# Patient Record
Sex: FEMALE | Race: WHITE | NOT HISPANIC OR LATINO | Employment: FULL TIME | URBAN - METROPOLITAN AREA
[De-identification: names, ages, dates, MRNs, and addresses within clinical notes are randomized per-mention and may not be internally consistent; named-entity substitution may affect disease eponyms.]

---

## 2021-04-19 ENCOUNTER — TRANSCRIBE ORDERS (OUTPATIENT)
Dept: ADMINISTRATIVE | Facility: HOSPITAL | Age: 66
End: 2021-04-19

## 2021-04-19 DIAGNOSIS — R70.0 ELEVATED SED RATE: ICD-10-CM

## 2021-04-19 DIAGNOSIS — D64.9 ANEMIA, UNSPECIFIED TYPE: Primary | ICD-10-CM

## 2021-04-29 ENCOUNTER — HOSPITAL ENCOUNTER (OUTPATIENT)
Dept: RADIOLOGY | Facility: MEDICAL CENTER | Age: 66
Discharge: HOME | End: 2021-04-29
Payer: COMMERCIAL

## 2021-04-29 ENCOUNTER — APPOINTMENT (OUTPATIENT)
Dept: RADIOLOGY | Facility: MEDICAL CENTER | Age: 66
End: 2021-04-29
Payer: COMMERCIAL

## 2021-04-29 ENCOUNTER — TRANSCRIBE ORDERS (OUTPATIENT)
Dept: ADMINISTRATIVE | Facility: HOSPITAL | Age: 66
End: 2021-04-29

## 2021-04-29 DIAGNOSIS — D64.9 ANEMIA, UNSPECIFIED TYPE: ICD-10-CM

## 2021-04-29 DIAGNOSIS — R70.0 ELEVATED SED RATE: ICD-10-CM

## 2021-04-29 DIAGNOSIS — R05.9 COUGH: Primary | ICD-10-CM

## 2021-04-29 DIAGNOSIS — R05.9 COUGH: ICD-10-CM

## 2021-04-29 PROCEDURE — 71046 X-RAY EXAM CHEST 2 VIEWS: CPT

## 2021-04-29 PROCEDURE — G1004 CDSM NDSC: HCPCS

## 2021-04-29 PROCEDURE — 74177 CT ABD & PELVIS W/CONTRAST: CPT

## 2021-04-29 RX ADMIN — IOHEXOL 100 ML: 350 INJECTION, SOLUTION INTRAVENOUS at 09:59

## 2021-04-29 RX ADMIN — IOHEXOL 50 ML: 240 INJECTION, SOLUTION INTRATHECAL; INTRAVASCULAR; INTRAVENOUS; ORAL at 09:59

## 2021-09-01 ENCOUNTER — OFFICE VISIT (OUTPATIENT)
Dept: GASTROENTEROLOGY | Facility: CLINIC | Age: 66
End: 2021-09-01
Payer: COMMERCIAL

## 2021-09-01 VITALS
DIASTOLIC BLOOD PRESSURE: 78 MMHG | SYSTOLIC BLOOD PRESSURE: 121 MMHG | BODY MASS INDEX: 27.66 KG/M2 | HEART RATE: 87 BPM | WEIGHT: 162 LBS | HEIGHT: 64 IN

## 2021-09-01 DIAGNOSIS — D50.9 IRON DEFICIENCY ANEMIA, UNSPECIFIED IRON DEFICIENCY ANEMIA TYPE: ICD-10-CM

## 2021-09-01 DIAGNOSIS — Z12.11 SCREENING FOR COLON CANCER: Primary | ICD-10-CM

## 2021-09-01 DIAGNOSIS — Z12.12 SCREENING FOR CANCER OF THE RECTUM: ICD-10-CM

## 2021-09-01 PROCEDURE — 99204 OFFICE O/P NEW MOD 45 MIN: CPT | Performed by: INTERNAL MEDICINE

## 2021-09-01 RX ORDER — ALBUTEROL SULFATE 90 UG/1
AEROSOL, METERED RESPIRATORY (INHALATION)
COMMUNITY
Start: 2021-08-04

## 2021-09-01 RX ORDER — IBUPROFEN 200 MG
TABLET ORAL
COMMUNITY
End: 2021-10-07 | Stop reason: ALTCHOICE

## 2021-09-01 RX ORDER — POLYETHYLENE GLYCOL 3350 17 G/17G
POWDER, FOR SOLUTION ORAL
Qty: 238 G | Refills: 0 | Status: SHIPPED | OUTPATIENT
Start: 2021-09-01 | End: 2021-10-07 | Stop reason: ALTCHOICE

## 2021-09-01 NOTE — PROGRESS NOTES
[facility] - Outpatient Consultation  David Lawton 78 y o  female MRN: [id number]  Encounter: [id number]          ASSESSMENT AND PLAN:      1  Screening for colon cancer  -     Colonoscopy; Future; Expected date: 09/01/2021  -     EGD; Future; Expected date: 09/01/2021  -     polyethylene glycol (MiraLax) 17 GM/SCOOP powder; Day prior to procedure: clear liquid diet only  At 2pm take 300ml Magnesium Citrate  At 6pm take Miralax prep (238g Miralax mixed with 64 oz Gatorade)    2  Iron deficiency anemia, unspecified iron deficiency anemia type  -     Colonoscopy; Future; Expected date: 09/01/2021  -     EGD; Future; Expected date: 09/01/2021  -     polyethylene glycol (MiraLax) 17 GM/SCOOP powder; Day prior to procedure: clear liquid diet only  At 2pm take 300ml Magnesium Citrate  At 6pm take Miralax prep (238g Miralax mixed with 64 oz Gatorade)    3  Screening for cancer of the rectum  -     Colonoscopy; Future; Expected date: 09/01/2021  -     EGD; Future; Expected date: 09/01/2021  -     polyethylene glycol (MiraLax) 17 GM/SCOOP powder; Day prior to procedure: clear liquid diet only  At 2pm take 300ml Magnesium Citrate  At 6pm take Miralax prep (238g Miralax mixed with 64 oz Gatorade)      History of anemia, no apparent GI blood loss though still GI source remains a diagnosis of exclusion  Also needs screening colonoscopy  Was set up for EGD colonoscopy for further evaluation of these and go from there  The procedure and prep discussed at length with patient  CT abdomen was unrevealing  ______________________________________________________________________    HPI:      I thank you for ask me see Nehemias Hughes for evaluation of her anemia  Found to be anemic, had a CT scan unremarkable, appetite is fair weight stable denies any melena hematochezia nausea vomiting blood in stools or urine, denies any psych ENT  gyn problems    Denies any chest pain shortness of breath dysphagia coughing choking spells nocturnal reflux regurgitation bronchitis pneumonias bowels generally regular  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  No fever chills travel no one else around her sick, is not a vegetarian, does not donate blood  Diet medications more than 10 pertinent systems reviewed  REVIEW OF SYSTEMS:    CONSTITUTIONAL: Denies any fever, chills, rigors, and weight loss  HEENT: No earache or tinnitus  CARDIOVASCULAR: No chest pain or palpitations  RESPIRATORY: Denies any cough, hemoptysis, shortness of breath or dyspnea on exertion  GASTROINTESTINAL: As noted in the History of Present Illness  GENITOURINARY: Denies any hematuria or dysuria  NEUROLOGIC: No dizziness or vertigo  MUSCULOSKELETAL: Denies any joint swellings  SKIN: Denies skin rashes or itching  ENDOCRINE: Denies excessive thirst  Denies intolerance to heat or cold  PSYCHOSOCIAL: Denies depression or anxiety  Denies any recent memory loss  Historical Information   History reviewed  No pertinent past medical history    Past Surgical History:   Procedure Laterality Date    TUBAL LIGATION       Social History   Social History     Substance and Sexual Activity   Alcohol Use Never     Social History     Substance and Sexual Activity   Drug Use Never     Social History     Tobacco Use   Smoking Status Current Every Day Smoker   Smokeless Tobacco Never Used     Family History   Problem Relation Age of Onset    No Known Problems Mother     No Known Problems Father        Meds/Allergies       Current Outpatient Medications:     albuterol (PROVENTIL HFA,VENTOLIN HFA) 90 mcg/act inhaler    Ascorbic Acid (VITAMIN C PO)    Cholecalciferol (VITAMIN D3 PO)    ibuprofen (MOTRIN) 200 mg tablet    Multiple Vitamin (MULTIVITAMIN ADULT PO)    Red Yeast Rice Extract (RED YEAST RICE PO)    polyethylene glycol (MiraLax) 17 GM/SCOOP powder    Allergies   Allergen Reactions    Levofloxacin Hives and Other (See Comments)     Burning and hot reaction      Penicillins Hives    Sulfamethoxazole-Trimethoprim Hives    Codeine Itching and Rash    Silver Hives and Rash           Objective     Blood pressure 121/78, pulse 87, height 5' 4" (1 626 m), weight 73 5 kg (162 lb)  Body mass index is 27 81 kg/m²  PHYSICAL EXAM:      General Appearance:   Alert, cooperative, no distress   HEENT:   Normocephalic, atraumatic, anicteric  Neck:  Supple, symmetrical, trachea midline   Lungs:   Clear to auscultation bilaterally; no rales, rhonchi or wheezing; respirations unlabored    Heart[de-identified]   Regular rate and rhythm; no murmur  Abdomen:   Soft, non-tender, non-distended; normal bowel sounds; no masses, no organomegaly    Genitalia:   Deferred    Rectal:   Deferred    Extremities:  No cyanosis, clubbing or edema    Skin:  No jaundice, rashes, or lesions    Lymph nodes:  No palpable cervical lymphadenopathy        Lab Results:   No visits with results within 1 Day(s) from this visit  Latest known visit with results is:   No results found for any previous visit  Radiology Results:   No results found

## 2021-09-02 ENCOUNTER — TELEPHONE (OUTPATIENT)
Dept: GASTROENTEROLOGY | Facility: CLINIC | Age: 66
End: 2021-09-02

## 2021-10-07 ENCOUNTER — ANESTHESIA (OUTPATIENT)
Dept: GASTROENTEROLOGY | Facility: AMBULATORY SURGERY CENTER | Age: 66
End: 2021-10-07

## 2021-10-07 ENCOUNTER — ANESTHESIA EVENT (OUTPATIENT)
Dept: GASTROENTEROLOGY | Facility: AMBULATORY SURGERY CENTER | Age: 66
End: 2021-10-07

## 2021-10-07 ENCOUNTER — HOSPITAL ENCOUNTER (OUTPATIENT)
Dept: GASTROENTEROLOGY | Facility: AMBULATORY SURGERY CENTER | Age: 66
Discharge: HOME | End: 2021-10-07
Payer: COMMERCIAL

## 2021-10-07 VITALS
DIASTOLIC BLOOD PRESSURE: 71 MMHG | RESPIRATION RATE: 18 BRPM | HEIGHT: 64 IN | SYSTOLIC BLOOD PRESSURE: 119 MMHG | BODY MASS INDEX: 27.66 KG/M2 | TEMPERATURE: 98.3 F | HEART RATE: 59 BPM | WEIGHT: 162 LBS | OXYGEN SATURATION: 100 %

## 2021-10-07 DIAGNOSIS — D50.9 IRON DEFICIENCY ANEMIA, UNSPECIFIED IRON DEFICIENCY ANEMIA TYPE: ICD-10-CM

## 2021-10-07 DIAGNOSIS — Z12.11 SCREENING FOR COLON CANCER: ICD-10-CM

## 2021-10-07 DIAGNOSIS — Z12.12 SCREENING FOR CANCER OF THE RECTUM: ICD-10-CM

## 2021-10-07 PROCEDURE — 88341 IMHCHEM/IMCYTCHM EA ADD ANTB: CPT | Performed by: PATHOLOGY

## 2021-10-07 PROCEDURE — 88342 IMHCHEM/IMCYTCHM 1ST ANTB: CPT | Performed by: PATHOLOGY

## 2021-10-07 PROCEDURE — 88305 TISSUE EXAM BY PATHOLOGIST: CPT | Performed by: PATHOLOGY

## 2021-10-07 PROCEDURE — 45385 COLONOSCOPY W/LESION REMOVAL: CPT | Performed by: INTERNAL MEDICINE

## 2021-10-07 PROCEDURE — 43239 EGD BIOPSY SINGLE/MULTIPLE: CPT | Performed by: INTERNAL MEDICINE

## 2021-10-07 PROCEDURE — 00813 ANES UPR LWR GI NDSC PX: CPT | Performed by: NURSE ANESTHETIST, CERTIFIED REGISTERED

## 2021-10-07 RX ORDER — PROPRANOLOL/HYDROCHLOROTHIAZID 40 MG-25MG
TABLET ORAL DAILY
COMMUNITY

## 2021-10-07 RX ORDER — SODIUM CHLORIDE 9 MG/ML
125 INJECTION, SOLUTION INTRAVENOUS CONTINUOUS
Status: DISCONTINUED | OUTPATIENT
Start: 2021-10-07 | End: 2021-10-11 | Stop reason: HOSPADM

## 2021-10-07 RX ORDER — SODIUM CHLORIDE 9 MG/ML
INJECTION, SOLUTION INTRAVENOUS CONTINUOUS PRN
Status: DISCONTINUED | OUTPATIENT
Start: 2021-10-07 | End: 2021-10-07

## 2021-10-07 RX ORDER — PROPOFOL 10 MG/ML
INJECTION, EMULSION INTRAVENOUS AS NEEDED
Status: DISCONTINUED | OUTPATIENT
Start: 2021-10-07 | End: 2021-10-07

## 2021-10-07 RX ADMIN — PROPOFOL 100 MG: 10 INJECTION, EMULSION INTRAVENOUS at 13:39

## 2021-10-07 RX ADMIN — SODIUM CHLORIDE: 9 INJECTION, SOLUTION INTRAVENOUS at 13:38

## 2021-10-07 RX ADMIN — PROPOFOL 100 MG: 10 INJECTION, EMULSION INTRAVENOUS at 13:47

## 2021-10-07 RX ADMIN — PROPOFOL 50 MG: 10 INJECTION, EMULSION INTRAVENOUS at 13:44

## 2023-03-07 LAB — HBA1C MFR BLD HPLC: 6.3 %

## 2023-06-20 ENCOUNTER — CONSULT (OUTPATIENT)
Dept: GASTROENTEROLOGY | Facility: CLINIC | Age: 68
End: 2023-06-20
Payer: COMMERCIAL

## 2023-06-20 VITALS
WEIGHT: 158.6 LBS | BODY MASS INDEX: 27.08 KG/M2 | DIASTOLIC BLOOD PRESSURE: 80 MMHG | SYSTOLIC BLOOD PRESSURE: 127 MMHG | HEIGHT: 64 IN | HEART RATE: 70 BPM

## 2023-06-20 DIAGNOSIS — K76.0 FATTY LIVER: Primary | ICD-10-CM

## 2023-06-20 DIAGNOSIS — K82.4 GALL BLADDER POLYP: ICD-10-CM

## 2023-06-20 DIAGNOSIS — K44.9 HIATAL HERNIA: ICD-10-CM

## 2023-06-20 DIAGNOSIS — Z86.010 PERSONAL HISTORY OF COLONIC POLYPS: ICD-10-CM

## 2023-06-20 DIAGNOSIS — R16.0 HEPATOMEGALY: ICD-10-CM

## 2023-06-20 DIAGNOSIS — K57.90 DIVERTICULAR DISEASE: ICD-10-CM

## 2023-06-20 PROCEDURE — 99214 OFFICE O/P EST MOD 30 MIN: CPT | Performed by: INTERNAL MEDICINE

## 2023-06-20 RX ORDER — DICLOFENAC SODIUM 75 MG/1
TABLET, DELAYED RELEASE ORAL
COMMUNITY
Start: 2023-03-28

## 2023-06-20 RX ORDER — CHLORAL HYDRATE 500 MG
1000 CAPSULE ORAL DAILY
COMMUNITY

## 2023-06-20 RX ORDER — METHYLPREDNISOLONE 4 MG/1
TABLET ORAL
COMMUNITY
Start: 2023-06-15

## 2023-06-20 NOTE — PROGRESS NOTES
[facility] - Outpatient Consultation  Gary Coker 75 y o  female MRN: [id number]  Encounter: [id number]          ASSESSMENT AND PLAN:      1  Fatty liver  -     CBC and differential; Future  -     Comprehensive metabolic panel; Future  -     Iron Panel (Includes Ferritin, Iron Sat%, Iron, and TIBC); Future  -     BRISEYDA Screen w/ Reflex to Titer/Pattern; Future  -     Chronic Hepatitis Panel; Future    2  Diverticular disease    3  Personal history of colonic polyps    4  Hiatal hernia    5  Gall bladder polyp    6  Hepatomegaly      History of for mild fatty liver, minimal elevation of LFTs, her main risk factor for fatty liver being hyperlipidemia prediabetes, BMI is around 27  Consequence of fatty liver including risk of advanced liver disease discussed  We will check labs to check for any other liver disease etiology  Patient encouraged to lose some weight, repeat LFTs labs in a few months time  Follow-up in my office in 6 months  Prior EGD colonoscopy biopsy results noted  ______________________________________________________________________    HPI:     Patient came in for evaluation of her fatty liver, diagnosed on ultrasound  She denies any known history of liver disease hepatitis jaundice high risk behavior or excessive alcohol use  She does have history of prediabetes and hyperlipidemia  She denies any family history of advanced liver disease  Appetite has been fair weight fluctuates though she has been trying to lose weight very actively  Denies any upper GI symptoms of dysphagia coughing choking spells major bowel issues  No history of CVA CAD CAD stents pacemakers  Reasonably active  Diet medications more than 10 systems reviewed  REVIEW OF SYSTEMS:    CONSTITUTIONAL: Denies any fever, chills, rigors, and weight loss  HEENT: No earache or tinnitus  CARDIOVASCULAR: No chest pain or palpitations     RESPIRATORY: Denies any cough, hemoptysis, shortness of breath or dyspnea on exertion  GASTROINTESTINAL: As noted in the History of Present Illness  GENITOURINARY: Denies any hematuria or dysuria  NEUROLOGIC: No dizziness or vertigo  MUSCULOSKELETAL: Denies any joint swellings  SKIN: Denies skin rashes or itching  ENDOCRINE: Denies excessive thirst  Denies intolerance to heat or cold  PSYCHOSOCIAL: Denies depression or anxiety  Denies any recent memory loss         Historical Information   Past Medical History:   Diagnosis Date   • Anemia    • Borderline high cholesterol    • Colon polyp    • History of bronchitis    • Iron deficiency anemia    • Kidney stone      Past Surgical History:   Procedure Laterality Date   • COLONOSCOPY     • LITHOTRIPSY     • TONSILLECTOMY     • TUBAL LIGATION     • UPPER GASTROINTESTINAL ENDOSCOPY     • WISDOM TOOTH EXTRACTION       Social History   Social History     Substance and Sexual Activity   Alcohol Use Not Currently     Social History     Substance and Sexual Activity   Drug Use Never     Social History     Tobacco Use   Smoking Status Every Day   • Packs/day: 0 50   • Types: Cigarettes   Smokeless Tobacco Never     Family History   Problem Relation Age of Onset   • No Known Problems Mother    • No Known Problems Father        Meds/Allergies       Current Outpatient Medications:   •  albuterol (PROVENTIL HFA,VENTOLIN HFA) 90 mcg/act inhaler  •  Ascorbic Acid (VITAMIN C PO)  •  B Complex Vitamins (B COMPLEX PO)  •  BIOTIN PO  •  BLACK ELDERBERRY PO  •  Cetirizine HCl (ZYRTEC PO)  •  Cholecalciferol (VITAMIN D3 PO)  •  Chromium Picolinate (CHROMIUM PICOLATE PO)  •  Ibuprofen (ADVIL PO)  •  MELATONIN PO  •  metFORMIN (GLUCOPHAGE) 1000 MG tablet  •  methylPREDNISolone 4 MG tablet therapy pack  •  Multiple Vitamin (MULTIVITAMIN ADULT PO)  •  Multiple Vitamins-Minerals (ZINC PO)  •  Omega-3 Fatty Acids (fish oil) 1,000 mg  •  Red Yeast Rice Extract (RED YEAST RICE PO)  •  Turmeric 500 MG CAPS  •  diclofenac (VOLTAREN) 75 mg EC "tablet  •  diphenhydrAMINE-APAP  & 500 MG MISC    Allergies   Allergen Reactions   • Levofloxacin Hives and Other (See Comments)     Burning and hot reaction     • Penicillins Hives   • Sulfamethoxazole-Trimethoprim Hives   • Codeine Itching and Rash   • Silver Hives and Rash           Objective     Blood pressure 127/80, pulse 70, height 5' 4\" (1 626 m), weight 71 9 kg (158 lb 9 6 oz)  Body mass index is 27 22 kg/m²  PHYSICAL EXAM:      General Appearance:   Alert, cooperative, no distress   HEENT:   Normocephalic, atraumatic, anicteric  Neck:  Supple, symmetrical, trachea midline   Lungs:   Clear to auscultation bilaterally; no rales, rhonchi or wheezing; respirations unlabored    Heart[de-identified]   Regular rate and rhythm; no murmur  Abdomen:   Soft, non-tender, non-distended; normal bowel sounds; no masses, no organomegaly    Genitalia:   Deferred    Rectal:   Deferred    Extremities:  No cyanosis, clubbing or edema    Skin:  No jaundice, rashes, or lesions    Lymph nodes:  No palpable cervical lymphadenopathy        Lab Results:   No visits with results within 1 Day(s) from this visit  Latest known visit with results is:   Orders Only on 03/07/2023   Component Date Value   • Hemoglobin A1C 03/07/2023 6 3          Radiology Results:   No results found    "

## 2023-06-20 NOTE — PROGRESS NOTES
[facility] - Outpatient Follow-up Note  Gary Coker 75 y o  female MRN: [id number]  Encounter: [id number]          ASSESSMENT AND PLAN:      1  Fatty liver  -     CBC and differential; Future  -     Comprehensive metabolic panel; Future  -     Iron Panel (Includes Ferritin, Iron Sat%, Iron, and TIBC); Future  -     BRISEYDA Screen w/ Reflex to Titer/Pattern; Future  -     Chronic Hepatitis Panel; Future    2  Diverticular disease    3  Personal history of colonic polyps    4  Hiatal hernia    5  Gall bladder polyp    6  Hepatomegaly      Patient has fatty liver, mild hepatomegaly at 17 cm, BMI is 27, does have hyperlipidemia and prediabetic  Those may be high risk factors  Minimal elevation of ALT in the past, hemoglobin A1c 6 3 cholesterol 225  Consequence of liver disease including advanced liver disease discussed  We will also check for any other liver disease  Repeat LFTs in a couple months time, and encouraged her to lose some weight, follow-up with us in 4 to 6 months time  ______________________________________________________________________    SUBJECTIVE:      Patient came in for evaluation of her fatty liver found on ultrasound, she denies any known history of liver disease hepatitis jaundice high risk behavior or family history of advanced liver disease  Appetite has been fair she is trying to watch her diet and weight  Denies any excessive alcohol use, does have a history of prediabetes and hyperlipidemia  Denies any dysphagia upper GI symptoms coughing choking spells major bowel issues  She had EGD colonoscopy done in 2021  Denies any history of CVA CAD CAD stents pacemaker diet medications more than 10 systems reviewed  REVIEW OF SYSTEMS IS OTHERWISE NEGATIVE        Historical Information   Past Medical History:   Diagnosis Date   • Anemia    • Borderline high cholesterol    • Colon polyp    • History of bronchitis    • Iron deficiency anemia    • Kidney "stone      Past Surgical History:   Procedure Laterality Date   • COLONOSCOPY     • LITHOTRIPSY     • TONSILLECTOMY     • TUBAL LIGATION     • UPPER GASTROINTESTINAL ENDOSCOPY     • WISDOM TOOTH EXTRACTION       Social History   Social History     Substance and Sexual Activity   Alcohol Use Not Currently     Social History     Substance and Sexual Activity   Drug Use Never     Social History     Tobacco Use   Smoking Status Every Day   • Packs/day: 0 50   • Types: Cigarettes   Smokeless Tobacco Never     Family History   Problem Relation Age of Onset   • No Known Problems Mother    • No Known Problems Father        Meds/Allergies       Current Outpatient Medications:   •  albuterol (PROVENTIL HFA,VENTOLIN HFA) 90 mcg/act inhaler  •  Ascorbic Acid (VITAMIN C PO)  •  B Complex Vitamins (B COMPLEX PO)  •  BIOTIN PO  •  BLACK ELDERBERRY PO  •  Cetirizine HCl (ZYRTEC PO)  •  Cholecalciferol (VITAMIN D3 PO)  •  Chromium Picolinate (CHROMIUM PICOLATE PO)  •  Ibuprofen (ADVIL PO)  •  MELATONIN PO  •  metFORMIN (GLUCOPHAGE) 1000 MG tablet  •  methylPREDNISolone 4 MG tablet therapy pack  •  Multiple Vitamin (MULTIVITAMIN ADULT PO)  •  Multiple Vitamins-Minerals (ZINC PO)  •  Omega-3 Fatty Acids (fish oil) 1,000 mg  •  Red Yeast Rice Extract (RED YEAST RICE PO)  •  Turmeric 500 MG CAPS  •  diclofenac (VOLTAREN) 75 mg EC tablet  •  diphenhydrAMINE-APAP  & 500 MG MISC    Allergies   Allergen Reactions   • Levofloxacin Hives and Other (See Comments)     Burning and hot reaction     • Penicillins Hives   • Sulfamethoxazole-Trimethoprim Hives   • Codeine Itching and Rash   • Silver Hives and Rash           Objective     Blood pressure 127/80, pulse 70, height 5' 4\" (1 626 m), weight 71 9 kg (158 lb 9 6 oz)  Body mass index is 27 22 kg/m²  PHYSICAL EXAM:      General Appearance:   Alert, cooperative, no distress   HEENT:   Normocephalic, atraumatic, anicteric       Neck:  Supple, symmetrical, trachea midline   Lungs:   " Clear to auscultation bilaterally; no rales, rhonchi or wheezing; respirations unlabored    Heart[de-identified]   Regular rate and rhythm; no murmur  Abdomen:   Soft, non-tender, non-distended; normal bowel sounds; no masses, no organomegaly    Genitalia:   Deferred    Rectal:   Deferred    Extremities:  No cyanosis, clubbing or edema    Skin:  No jaundice, rashes, or lesions    Lymph nodes:  No palpable cervical lymphadenopathy        Lab Results:   No visits with results within 1 Day(s) from this visit  Latest known visit with results is:   Orders Only on 03/07/2023   Component Date Value   • Hemoglobin A1C 03/07/2023 6 3          Radiology Results:   No results found

## 2024-03-01 ENCOUNTER — TELEPHONE (OUTPATIENT)
Dept: GASTROENTEROLOGY | Facility: CLINIC | Age: 69
End: 2024-03-01

## 2024-04-19 LAB — HCV AB SER-ACNC: NORMAL

## 2024-05-29 ENCOUNTER — TELEPHONE (OUTPATIENT)
Dept: GASTROENTEROLOGY | Facility: CLINIC | Age: 69
End: 2024-05-29

## 2024-05-29 NOTE — TELEPHONE ENCOUNTER
Gaelm to see if she wanted to schedule an ov with Dr Lee at [address] [place] [address] Drive for this afternoon. Sb

## 2024-08-28 ENCOUNTER — TELEPHONE (OUTPATIENT)
Age: 69
End: 2024-08-28

## 2024-08-28 NOTE — TELEPHONE ENCOUNTER
Brandi MATA called for addtional Dx codes for Hep C antibody completed 4/19/24.     Please see below and advise if any other Dx applies:    [email or web address]    Brandi [phone]. Ok to leave message as she only works until 12:30pm

## 2024-09-03 ENCOUNTER — OFFICE VISIT (OUTPATIENT)
Dept: GASTROENTEROLOGY | Facility: CLINIC | Age: 69
End: 2024-09-03
Payer: MEDICARE

## 2024-09-03 VITALS
HEART RATE: 78 BPM | DIASTOLIC BLOOD PRESSURE: 65 MMHG | SYSTOLIC BLOOD PRESSURE: 103 MMHG | HEIGHT: 63 IN | BODY MASS INDEX: 25.34 KG/M2 | WEIGHT: 143 LBS

## 2024-09-03 DIAGNOSIS — K76.0 FATTY LIVER: Primary | ICD-10-CM

## 2024-09-03 DIAGNOSIS — R16.0 HEPATOMEGALY: ICD-10-CM

## 2024-09-03 DIAGNOSIS — Z86.010 PERSONAL HISTORY OF COLONIC POLYPS: ICD-10-CM

## 2024-09-03 DIAGNOSIS — K44.9 HIATAL HERNIA: ICD-10-CM

## 2024-09-03 DIAGNOSIS — K82.4 GALL BLADDER POLYP: ICD-10-CM

## 2024-09-03 PROCEDURE — 99214 OFFICE O/P EST MOD 30 MIN: CPT | Performed by: INTERNAL MEDICINE

## 2024-09-03 PROCEDURE — 1123F ACP DISCUSS/DSCN MKR DOCD: CPT | Performed by: INTERNAL MEDICINE

## 2024-09-03 NOTE — PROGRESS NOTES
[facility] [place] - Outpatient Follow-up Note  Serenity Abdullahi 69 y.o. female MRN: [id number]  Encounter: [id number]          ASSESSMENT AND PLAN:      1. Fatty liver  -     US abdomen limited; Future; Expected date: 09/03/2024  2. Personal history of colonic polyps  3. Hepatomegaly  4. Gall bladder polyp  -     US abdomen limited; Future; Expected date: 09/03/2024  5. Hiatal hernia    History of fatty liver, from hyperlipidemia prediabetes condition, clinically stable, she is lost some weight which definitely help her fatty liver as well, continue same, will check an ultrasound to follow-up on the same.  She does have history of gallbladder polyp 6 mm size logistically of no significant consequences we will follow-up with an ultrasound for any interval change.  Personal history of colon polyp hiatal hernia, antireflux measure reviewed diet discussed, follow-up planned as discussed in the past  ______________________________________________________________________    SUBJECTIVE:      Patient came in for follow-up of her multiple GI issues, she was diagnosed with fatty liver on ultrasound and a and since then she has lost about 10 to 15 pounds feeling much better.  Appetite is fair weight stable, denies any dysphagia coughing choking spells nocturnal reflux agitation bronchitis pneumonias.  Reasonably active, diet medications more than 10 pertinent systems reviewed.  Patient gives history of hyperlipidemia and prediabetes.  No history of CVA CCD stents pacemakers.        REVIEW OF SYSTEMS IS OTHERWISE NEGATIVE.      Historical Information   Past Medical History:   Diagnosis Date   • Anemia    • Borderline high cholesterol    • Colon polyp    • History of bronchitis    • Iron deficiency anemia    • Kidney stone      Past Surgical History:   Procedure Laterality Date   • COLONOSCOPY     • LITHOTRIPSY     • TONSILLECTOMY     • TUBAL LIGATION     • UPPER GASTROINTESTINAL ENDOSCOPY     • WISDOM TOOTH  "EXTRACTION       Social History   Social History     Substance and Sexual Activity   Alcohol Use Not Currently     Social History     Substance and Sexual Activity   Drug Use Never     Social History     Tobacco Use   Smoking Status Every Day   • Current packs/day: 0.50   • Types: Cigarettes   Smokeless Tobacco Never     Family History   Problem Relation Age of Onset   • No Known Problems Mother    • No Known Problems Father        Meds/Allergies       Current Outpatient Medications:   •  albuterol (PROVENTIL HFA,VENTOLIN HFA) 90 mcg/act inhaler  •  Ascorbic Acid (VITAMIN C PO)  •  B Complex Vitamins (B COMPLEX PO)  •  BIOTIN PO  •  BLACK ELDERBERRY PO  •  Cetirizine HCl (ZYRTEC PO)  •  Cholecalciferol (VITAMIN D3 PO)  •  Chromium Picolinate (CHROMIUM PICOLATE PO)  •  diclofenac (VOLTAREN) 75 mg EC tablet  •  MELATONIN PO  •  metFORMIN (GLUCOPHAGE) 1000 MG tablet  •  Multiple Vitamin (MULTIVITAMIN ADULT PO)  •  Multiple Vitamins-Minerals (ZINC PO)  •  Red Yeast Rice Extract (RED YEAST RICE PO)  •  Turmeric 500 MG CAPS  •  diphenhydrAMINE-APAP  & 500 MG MISC  •  Ibuprofen (ADVIL PO)  •  methylPREDNISolone 4 MG tablet therapy pack  •  Omega-3 Fatty Acids (fish oil) 1,000 mg    Allergies   Allergen Reactions   • Levofloxacin Hives and Other (See Comments)     Burning and hot reaction     • Penicillins Hives   • Sulfamethoxazole-Trimethoprim Hives   • Codeine Itching and Rash   • Silver Hives and Rash           Objective     Blood pressure 103/65, pulse 78, height 5' 3\" (1.6 m), weight 64.9 kg (143 lb). Body mass index is 25.33 kg/m².      PHYSICAL EXAM:      General Appearance:   Alert, cooperative, no distress   HEENT:   Normocephalic, atraumatic, anicteric.     Neck:  Supple, symmetrical, trachea midline   Lungs:   Clear to auscultation bilaterally; no rales, rhonchi or wheezing; respirations unlabored    Heart::   Regular rate and rhythm; no murmur.   Abdomen:   Soft, non-tender, non-distended; normal bowel " sounds; no masses, no organomegaly    Genitalia:   Deferred    Rectal:   Deferred    Extremities:  No cyanosis, clubbing or edema    Skin:  No jaundice, rashes, or lesions    Lymph nodes:  No palpable cervical lymphadenopathy        Lab Results:   No visits with results within 1 Day(s) from this visit.   Latest known visit with results is:   Orders Only on 04/19/2024   Component Date Value   • HEP C AB 04/19/2024 NON-REACTIVE          Radiology Results:   No results found.

## 2024-11-12 ENCOUNTER — HOSPITAL ENCOUNTER (OUTPATIENT)
Dept: ULTRASOUND IMAGING | Facility: HOSPITAL | Age: 69
Discharge: HOME | End: 2024-11-12
Attending: INTERNAL MEDICINE
Payer: MEDICARE

## 2024-11-12 DIAGNOSIS — K82.4 GALL BLADDER POLYP: ICD-10-CM

## 2024-11-12 DIAGNOSIS — K76.0 FATTY LIVER: ICD-10-CM

## 2024-11-12 PROCEDURE — 76705 ECHO EXAM OF ABDOMEN: CPT

## 2024-11-19 ENCOUNTER — RESULTS FOLLOW-UP (OUTPATIENT)
Dept: GASTROENTEROLOGY | Facility: CLINIC | Age: 69
End: 2024-11-19

## 2024-11-19 NOTE — RESULT ENCOUNTER NOTE
Please call the patient regarding her abnormal result.  Ultrasound suggest fatty liver.  Continue to manage medical conditions and weight as discussed during office visit.  Repeat ultrasound with elastography in 1 year time follow up in my office as needed